# Patient Record
Sex: FEMALE | URBAN - METROPOLITAN AREA
[De-identification: names, ages, dates, MRNs, and addresses within clinical notes are randomized per-mention and may not be internally consistent; named-entity substitution may affect disease eponyms.]

---

## 2021-06-29 ENCOUNTER — OFFICE VISIT (OUTPATIENT)
Dept: URBAN - METROPOLITAN AREA CLINIC 24 | Facility: CLINIC | Age: 81
End: 2021-06-29
Payer: MEDICARE

## 2021-06-29 DIAGNOSIS — Z79.899 OTHER LONG TERM (CURRENT) DRUG THERAPY: ICD-10-CM

## 2021-06-29 DIAGNOSIS — M06.9 RHEUMATOID ARTHRITIS, UNSPECIFIED: ICD-10-CM

## 2021-06-29 DIAGNOSIS — H25.813 COMBINED FORMS OF AGE-RELATED CATARACT, BILATERAL: Primary | ICD-10-CM

## 2021-06-29 PROCEDURE — 92134 CPTRZ OPH DX IMG PST SGM RTA: CPT | Performed by: OPTOMETRIST

## 2021-06-29 PROCEDURE — 92083 EXTENDED VISUAL FIELD XM: CPT | Performed by: OPTOMETRIST

## 2021-06-29 PROCEDURE — 99204 OFFICE O/P NEW MOD 45 MIN: CPT | Performed by: OPTOMETRIST

## 2021-06-29 ASSESSMENT — KERATOMETRY
OD: 46.35
OS: 46.15

## 2021-06-29 ASSESSMENT — INTRAOCULAR PRESSURE
OS: 15
OD: 15

## 2021-06-29 ASSESSMENT — VISUAL ACUITY
OD: 20/30
OS: 20/30

## 2021-06-29 NOTE — IMPRESSION/PLAN
Impression: Combined forms of age-related cataract, bilateral: H25.813. Plan: Cataract accounts for patient's complaint. Discussed treatment options. Surgical treatment is recommended. Surgical risk and benefits discussed.   Patient elects surgical treatment, OU OD first. Pt is a candidate for multifocal, toric, standard, LenSx and ORA.  AIM; Verdia Benz

## 2021-06-29 NOTE — IMPRESSION/PLAN
Impression: Other long term (current) drug therapy: Z79.899.
-- plaquenil for RA since ~ 2018 Plan: No e/o ocular toxicity on HVF 10-2, sd mac-oct, and dfe. Pt edu; medication precautions discussed with patient. Notify clinic if vision changes. Will communicate with patients pcp (recently moved from [place]; does not have rheumatologist)

## 2022-04-27 ENCOUNTER — OFFICE VISIT (OUTPATIENT)
Dept: URBAN - METROPOLITAN AREA CLINIC 24 | Facility: CLINIC | Age: 82
End: 2022-04-27
Payer: MEDICARE

## 2022-04-27 DIAGNOSIS — M06.9 RHEUMATOID ARTHRITIS, UNSPECIFIED: ICD-10-CM

## 2022-04-27 DIAGNOSIS — Z79.899 OTHER LONG TERM (CURRENT) DRUG THERAPY: ICD-10-CM

## 2022-04-27 DIAGNOSIS — H43.393 OTHER VITREOUS OPACITIES, BILATERAL: ICD-10-CM

## 2022-04-27 PROCEDURE — 99214 OFFICE O/P EST MOD 30 MIN: CPT | Performed by: OPTOMETRIST

## 2022-04-27 PROCEDURE — 92134 CPTRZ OPH DX IMG PST SGM RTA: CPT | Performed by: OPTOMETRIST

## 2022-04-27 ASSESSMENT — VISUAL ACUITY
OS: 20/30
OD: 20/30

## 2022-04-27 ASSESSMENT — KERATOMETRY
OD: 46.40
OS: 46.10

## 2022-04-27 ASSESSMENT — INTRAOCULAR PRESSURE
OS: 14
OD: 14

## 2022-04-27 NOTE — IMPRESSION/PLAN
Impression: Other long term (current) drug therapy: Z79.899.
-- plaquenil for RA since ~ 2018 Plan: No e/o ocular toxicity on examination and OCT Pt edu; medication precautions discussed with patient.  
RTC if any vision changes or worsening/new onset symptoms

## 2022-04-27 NOTE — IMPRESSION/PLAN
Impression: Combined forms of age-related cataract, bilateral: H25.813. Plan: * Will need VF 10-2 before pre-op* Discussed cataract diagnosis with the patient. Discussed and reviewed treatment options for cataracts. Risks and benefits of surgical treatment were discussed and understood. Patient elects surgical treatment. Recommend surgery OU, OD first. Patient is candidate/interested in multifocal, toric, standard, LenSx and ORA. Aim OD: plano. Aim OS: plano. Patient will need glasses for full time wear. Patient may need glasses for all near work, including computer.

## 2022-05-26 ENCOUNTER — ADULT PHYSICAL (OUTPATIENT)
Dept: URBAN - METROPOLITAN AREA CLINIC 24 | Facility: CLINIC | Age: 82
End: 2022-05-26
Payer: MEDICARE

## 2022-05-26 DIAGNOSIS — H25.813 COMBINED FORMS OF AGE-RELATED CATARACT, BILATERAL: ICD-10-CM

## 2022-05-26 DIAGNOSIS — Z01.818 ENCOUNTER FOR OTHER PREPROCEDURAL EXAMINATION: Primary | ICD-10-CM

## 2022-05-26 PROCEDURE — 99203 OFFICE O/P NEW LOW 30 MIN: CPT | Performed by: PHYSICIAN ASSISTANT

## 2022-05-26 ASSESSMENT — PACHYMETRY
OS: 2.58
OD: 21.23
OD: 2.66
OS: 21.29

## 2022-06-01 ENCOUNTER — PRE-OPERATIVE VISIT (OUTPATIENT)
Dept: URBAN - METROPOLITAN AREA CLINIC 24 | Facility: CLINIC | Age: 82
End: 2022-06-01
Payer: MEDICARE

## 2022-06-01 DIAGNOSIS — M06.9 RHEUMATOID ARTHRITIS, UNSPECIFIED: ICD-10-CM

## 2022-06-01 DIAGNOSIS — H43.393 OTHER VITREOUS OPACITIES, BILATERAL: ICD-10-CM

## 2022-06-01 DIAGNOSIS — Z79.899 OTHER LONG TERM (CURRENT) DRUG THERAPY: ICD-10-CM

## 2022-06-01 PROCEDURE — 99204 OFFICE O/P NEW MOD 45 MIN: CPT | Performed by: OPHTHALMOLOGY

## 2022-06-01 NOTE — IMPRESSION/PLAN
Impression: Other long term (current) drug therapy: Z79.899. Plan:  Will warrant patient to have VFT 10-2 prior to surgery

## 2022-06-01 NOTE — IMPRESSION/PLAN
Impression: Combined forms of age-related cataract, bilateral: H25.813. Plan: Discussed cataract diagnosis with the patient. Discussed risks, benefits and alternatives to surgery including but not limited to: bleeding, infection, risk of vision loss, loss of the eye, need for other surgery. Patient voiced understanding and wishes to proceed. Patient elects surgical treatment. Advanced technology options Discussed with patient . Patient desires surgery OU, OD  first (( AIM - 0.25 OU, DEXYCU, Topical anesthesia, no Lensx, no LRI, declined ORA, * Will need VF 10-2 before surgery*)) Patient understands the need for glasses after surgery for BCVA.

## 2022-06-03 ENCOUNTER — TESTING ONLY (OUTPATIENT)
Dept: URBAN - METROPOLITAN AREA CLINIC 24 | Facility: CLINIC | Age: 82
End: 2022-06-03
Payer: MEDICARE

## 2022-06-03 DIAGNOSIS — H25.813 COMBINED FORMS OF AGE-RELATED CATARACT, BILATERAL: Primary | ICD-10-CM

## 2022-06-03 PROCEDURE — 92083 EXTENDED VISUAL FIELD XM: CPT | Performed by: OPHTHALMOLOGY

## 2022-06-06 ENCOUNTER — SURGERY (OUTPATIENT)
Dept: URBAN - METROPOLITAN AREA SURGERY 12 | Facility: SURGERY | Age: 82
End: 2022-06-06
Payer: MEDICARE

## 2022-06-06 DIAGNOSIS — H25.813 COMBINED FORMS OF AGE-RELATED CATARACT, BILATERAL: Primary | ICD-10-CM

## 2022-06-06 PROCEDURE — 66984 XCAPSL CTRC RMVL W/O ECP: CPT | Performed by: OPHTHALMOLOGY

## 2022-06-07 ENCOUNTER — POST-OPERATIVE VISIT (OUTPATIENT)
Dept: URBAN - METROPOLITAN AREA CLINIC 24 | Facility: CLINIC | Age: 82
End: 2022-06-07
Payer: MEDICARE

## 2022-06-07 DIAGNOSIS — Z48.810 ENCOUNTER FOR SURGICAL AFTERCARE FOLLOWING SURGERY ON A SENSE ORGAN: Primary | ICD-10-CM

## 2022-06-07 PROCEDURE — 99024 POSTOP FOLLOW-UP VISIT: CPT | Performed by: OPTOMETRIST

## 2022-06-07 ASSESSMENT — INTRAOCULAR PRESSURE: OD: 17

## 2022-06-07 NOTE — IMPRESSION/PLAN
Impression: S/P Cataract Extraction by phacoemulsification with IOL placement OD - 1 Day. Encounter for surgical aftercare following surgery on a sense organ  Z48.810. Plan: Post-operative instructions reviewed.

## 2022-06-14 ENCOUNTER — POST-OPERATIVE VISIT (OUTPATIENT)
Dept: URBAN - METROPOLITAN AREA CLINIC 24 | Facility: CLINIC | Age: 82
End: 2022-06-14
Payer: MEDICARE

## 2022-06-14 DIAGNOSIS — Z48.810 ENCOUNTER FOR SURGICAL AFTERCARE FOLLOWING SURGERY ON A SENSE ORGAN: Primary | ICD-10-CM

## 2022-06-14 PROCEDURE — 99024 POSTOP FOLLOW-UP VISIT: CPT | Performed by: OPTOMETRIST

## 2022-06-14 ASSESSMENT — INTRAOCULAR PRESSURE
OS: 14
OD: 10

## 2022-06-14 ASSESSMENT — VISUAL ACUITY
OD: 20/20
OS: 20/30

## 2022-06-14 NOTE — IMPRESSION/PLAN
Impression: S/P Cataract Extraction by phacoemulsification with IOL placement OD - 8 Days. Encounter for surgical aftercare following surgery on a sense organ  Z48.960.  Plan: Patient happy with outcome of cat sx OD and wishes to proceed with OS

## 2022-06-20 ENCOUNTER — SURGERY (OUTPATIENT)
Dept: URBAN - METROPOLITAN AREA SURGERY 12 | Facility: SURGERY | Age: 82
End: 2022-06-20
Payer: MEDICARE

## 2022-06-20 DIAGNOSIS — H25.812 COMBINED FORMS OF AGE-RELATED CATARACT, LEFT EYE: Primary | ICD-10-CM

## 2022-06-20 PROCEDURE — 66984 XCAPSL CTRC RMVL W/O ECP: CPT | Performed by: OPHTHALMOLOGY

## 2022-06-21 ENCOUNTER — POST-OPERATIVE VISIT (OUTPATIENT)
Dept: URBAN - METROPOLITAN AREA CLINIC 24 | Facility: CLINIC | Age: 82
End: 2022-06-21
Payer: MEDICARE

## 2022-06-21 DIAGNOSIS — Z96.1 PRESENCE OF INTRAOCULAR LENS: Primary | ICD-10-CM

## 2022-06-21 PROCEDURE — 99024 POSTOP FOLLOW-UP VISIT: CPT | Performed by: OPTOMETRIST

## 2022-06-21 ASSESSMENT — INTRAOCULAR PRESSURE: OS: 17

## 2022-06-21 NOTE — IMPRESSION/PLAN
Impression: S/P Cataract Extraction by phacoemulsification with IOL placement OS - 1 Day. Presence of intraocular lens  Z96.1. Plan: PO instructions reviewed.

## 2022-07-12 ENCOUNTER — POST-OPERATIVE VISIT (OUTPATIENT)
Dept: URBAN - METROPOLITAN AREA CLINIC 24 | Facility: CLINIC | Age: 82
End: 2022-07-12
Payer: MEDICARE

## 2022-07-12 DIAGNOSIS — H52.4 PRESBYOPIA: ICD-10-CM

## 2022-07-12 DIAGNOSIS — Z48.810 ENCOUNTER FOR SURGICAL AFTERCARE FOLLOWING SURGERY ON A SENSE ORGAN: Primary | ICD-10-CM

## 2022-07-12 PROCEDURE — 99024 POSTOP FOLLOW-UP VISIT: CPT | Performed by: OPTOMETRIST

## 2022-07-12 ASSESSMENT — VISUAL ACUITY
OD: 20/25
OS: 20/25

## 2022-07-12 ASSESSMENT — INTRAOCULAR PRESSURE
OD: 15
OS: 16

## 2022-07-12 NOTE — IMPRESSION/PLAN
Impression: S/P Cataract Extraction by phacoemulsification with IOL placement OS - 22 Days. Encounter for surgical aftercare following surgery on a sense organ  Z48.600. Plan: Patient happy with outcome of cat sx OU Srx released per pt request